# Patient Record
Sex: MALE | Race: OTHER | HISPANIC OR LATINO | URBAN - METROPOLITAN AREA
[De-identification: names, ages, dates, MRNs, and addresses within clinical notes are randomized per-mention and may not be internally consistent; named-entity substitution may affect disease eponyms.]

---

## 2018-02-20 ENCOUNTER — INPATIENT (INPATIENT)
Facility: HOSPITAL | Age: 23
LOS: 0 days | Discharge: HOME | DRG: 310 | End: 2018-02-21
Attending: SURGERY | Admitting: SURGERY
Payer: COMMERCIAL

## 2018-02-20 VITALS
OXYGEN SATURATION: 100 % | SYSTOLIC BLOOD PRESSURE: 158 MMHG | HEART RATE: 126 BPM | RESPIRATION RATE: 20 BRPM | TEMPERATURE: 99 F | DIASTOLIC BLOOD PRESSURE: 94 MMHG

## 2018-02-20 DIAGNOSIS — S06.0X0A CONCUSSION WITHOUT LOSS OF CONSCIOUSNESS, INITIAL ENCOUNTER: ICD-10-CM

## 2018-02-20 LAB
ALBUMIN SERPL ELPH-MCNC: 4.8 G/DL — SIGNIFICANT CHANGE UP (ref 3.3–5)
ALP SERPL-CCNC: 71 U/L — SIGNIFICANT CHANGE UP (ref 40–120)
ALT FLD-CCNC: 22 U/L RC — SIGNIFICANT CHANGE UP (ref 10–45)
ANION GAP SERPL CALC-SCNC: 14 MMOL/L — SIGNIFICANT CHANGE UP (ref 5–17)
APPEARANCE UR: CLEAR — SIGNIFICANT CHANGE UP
APTT BLD: 28.4 SEC — SIGNIFICANT CHANGE UP (ref 27.5–37.4)
AST SERPL-CCNC: 21 U/L — SIGNIFICANT CHANGE UP (ref 10–40)
BASOPHILS # BLD AUTO: 0.1 K/UL — SIGNIFICANT CHANGE UP (ref 0–0.2)
BASOPHILS NFR BLD AUTO: 0.3 % — SIGNIFICANT CHANGE UP (ref 0–2)
BILIRUB SERPL-MCNC: 0.3 MG/DL — SIGNIFICANT CHANGE UP (ref 0.2–1.2)
BILIRUB UR-MCNC: NEGATIVE — SIGNIFICANT CHANGE UP
BLD GP AB SCN SERPL QL: NEGATIVE — SIGNIFICANT CHANGE UP
BUN SERPL-MCNC: 10 MG/DL — SIGNIFICANT CHANGE UP (ref 7–23)
CALCIUM SERPL-MCNC: 9.5 MG/DL — SIGNIFICANT CHANGE UP (ref 8.4–10.5)
CHLORIDE SERPL-SCNC: 97 MMOL/L — SIGNIFICANT CHANGE UP (ref 96–108)
CK MB BLD-MCNC: 3.5 % — SIGNIFICANT CHANGE UP (ref 0–3.5)
CK MB CFR SERPL CALC: 8.9 NG/ML — HIGH (ref 0–6.7)
CK SERPL-CCNC: 256 U/L — HIGH (ref 30–200)
CO2 SERPL-SCNC: 27 MMOL/L — SIGNIFICANT CHANGE UP (ref 22–31)
COLOR SPEC: COLORLESS — SIGNIFICANT CHANGE UP
CREAT SERPL-MCNC: 0.72 MG/DL — SIGNIFICANT CHANGE UP (ref 0.5–1.3)
DIFF PNL FLD: NEGATIVE — SIGNIFICANT CHANGE UP
EOSINOPHIL # BLD AUTO: 0.2 K/UL — SIGNIFICANT CHANGE UP (ref 0–0.5)
EOSINOPHIL NFR BLD AUTO: 0.7 % — SIGNIFICANT CHANGE UP (ref 0–6)
GLUCOSE SERPL-MCNC: 116 MG/DL — HIGH (ref 70–99)
GLUCOSE UR QL: NEGATIVE — SIGNIFICANT CHANGE UP
HCT VFR BLD CALC: 45.7 % — SIGNIFICANT CHANGE UP (ref 39–50)
HGB BLD-MCNC: 16 G/DL — SIGNIFICANT CHANGE UP (ref 13–17)
INR BLD: 1.11 RATIO — SIGNIFICANT CHANGE UP (ref 0.88–1.16)
KETONES UR-MCNC: NEGATIVE — SIGNIFICANT CHANGE UP
LEUKOCYTE ESTERASE UR-ACNC: NEGATIVE — SIGNIFICANT CHANGE UP
LIDOCAIN IGE QN: 24 U/L — SIGNIFICANT CHANGE UP (ref 7–60)
LYMPHOCYTES # BLD AUTO: 1.8 K/UL — SIGNIFICANT CHANGE UP (ref 1–3.3)
LYMPHOCYTES # BLD AUTO: 8.6 % — LOW (ref 13–44)
MCHC RBC-ENTMCNC: 32 PG — SIGNIFICANT CHANGE UP (ref 27–34)
MCHC RBC-ENTMCNC: 35.1 GM/DL — SIGNIFICANT CHANGE UP (ref 32–36)
MCV RBC AUTO: 91.3 FL — SIGNIFICANT CHANGE UP (ref 80–100)
MONOCYTES # BLD AUTO: 0.8 K/UL — SIGNIFICANT CHANGE UP (ref 0–0.9)
MONOCYTES NFR BLD AUTO: 3.7 % — SIGNIFICANT CHANGE UP (ref 2–14)
NEUTROPHILS # BLD AUTO: 18.1 K/UL — HIGH (ref 1.8–7.4)
NEUTROPHILS NFR BLD AUTO: 86.7 % — HIGH (ref 43–77)
NITRITE UR-MCNC: NEGATIVE — SIGNIFICANT CHANGE UP
PH UR: 7 — SIGNIFICANT CHANGE UP (ref 5–8)
PLATELET # BLD AUTO: 401 K/UL — HIGH (ref 150–400)
POTASSIUM SERPL-MCNC: 3.1 MMOL/L — LOW (ref 3.5–5.3)
POTASSIUM SERPL-SCNC: 3.1 MMOL/L — LOW (ref 3.5–5.3)
PROT SERPL-MCNC: 8.6 G/DL — HIGH (ref 6–8.3)
PROT UR-MCNC: NEGATIVE — SIGNIFICANT CHANGE UP
PROTHROM AB SERPL-ACNC: 12.1 SEC — SIGNIFICANT CHANGE UP (ref 9.8–12.7)
RBC # BLD: 5.01 M/UL — SIGNIFICANT CHANGE UP (ref 4.2–5.8)
RBC # FLD: 11.3 % — SIGNIFICANT CHANGE UP (ref 10.3–14.5)
RH IG SCN BLD-IMP: POSITIVE — SIGNIFICANT CHANGE UP
SODIUM SERPL-SCNC: 138 MMOL/L — SIGNIFICANT CHANGE UP (ref 135–145)
SP GR SPEC: 1.02 — SIGNIFICANT CHANGE UP (ref 1.01–1.02)
TROPONIN T SERPL-MCNC: <0.01 NG/ML — SIGNIFICANT CHANGE UP (ref 0–0.06)
UROBILINOGEN FLD QL: NEGATIVE — SIGNIFICANT CHANGE UP
WBC # BLD: 20.9 K/UL — HIGH (ref 3.8–10.5)
WBC # FLD AUTO: 20.9 K/UL — HIGH (ref 3.8–10.5)

## 2018-02-20 PROCEDURE — 93010 ELECTROCARDIOGRAM REPORT: CPT | Mod: 59

## 2018-02-20 PROCEDURE — 73090 X-RAY EXAM OF FOREARM: CPT | Mod: 26,LT

## 2018-02-20 PROCEDURE — 70450 CT HEAD/BRAIN W/O DYE: CPT | Mod: 26

## 2018-02-20 PROCEDURE — 71260 CT THORAX DX C+: CPT | Mod: 26

## 2018-02-20 PROCEDURE — 99285 EMERGENCY DEPT VISIT HI MDM: CPT | Mod: 25

## 2018-02-20 PROCEDURE — 12002 RPR S/N/AX/GEN/TRNK2.6-7.5CM: CPT | Mod: RT

## 2018-02-20 PROCEDURE — 74177 CT ABD & PELVIS W/CONTRAST: CPT | Mod: 26

## 2018-02-20 RX ORDER — ONDANSETRON 8 MG/1
4 TABLET, FILM COATED ORAL ONCE
Qty: 0 | Refills: 0 | Status: COMPLETED | OUTPATIENT
Start: 2018-02-20 | End: 2018-02-20

## 2018-02-20 RX ORDER — SODIUM CHLORIDE 9 MG/ML
1000 INJECTION INTRAMUSCULAR; INTRAVENOUS; SUBCUTANEOUS ONCE
Qty: 0 | Refills: 0 | Status: COMPLETED | OUTPATIENT
Start: 2018-02-20 | End: 2018-02-20

## 2018-02-20 RX ORDER — ENOXAPARIN SODIUM 100 MG/ML
40 INJECTION SUBCUTANEOUS DAILY
Qty: 0 | Refills: 0 | Status: DISCONTINUED | OUTPATIENT
Start: 2018-02-20 | End: 2018-02-21

## 2018-02-20 RX ORDER — TETANUS TOXOID, REDUCED DIPHTHERIA TOXOID AND ACELLULAR PERTUSSIS VACCINE, ADSORBED 5; 2.5; 8; 8; 2.5 [IU]/.5ML; [IU]/.5ML; UG/.5ML; UG/.5ML; UG/.5ML
0.5 SUSPENSION INTRAMUSCULAR ONCE
Qty: 0 | Refills: 0 | Status: COMPLETED | OUTPATIENT
Start: 2018-02-20 | End: 2018-02-20

## 2018-02-20 RX ORDER — MORPHINE SULFATE 50 MG/1
4 CAPSULE, EXTENDED RELEASE ORAL ONCE
Qty: 0 | Refills: 0 | Status: DISCONTINUED | OUTPATIENT
Start: 2018-02-20 | End: 2018-02-20

## 2018-02-20 RX ADMIN — MORPHINE SULFATE 4 MILLIGRAM(S): 50 CAPSULE, EXTENDED RELEASE ORAL at 15:35

## 2018-02-20 RX ADMIN — ONDANSETRON 4 MILLIGRAM(S): 8 TABLET, FILM COATED ORAL at 13:29

## 2018-02-20 RX ADMIN — MORPHINE SULFATE 4 MILLIGRAM(S): 50 CAPSULE, EXTENDED RELEASE ORAL at 16:05

## 2018-02-20 RX ADMIN — SODIUM CHLORIDE 4000 MILLILITER(S): 9 INJECTION INTRAMUSCULAR; INTRAVENOUS; SUBCUTANEOUS at 16:50

## 2018-02-20 RX ADMIN — SODIUM CHLORIDE 4000 MILLILITER(S): 9 INJECTION INTRAMUSCULAR; INTRAVENOUS; SUBCUTANEOUS at 13:29

## 2018-02-20 RX ADMIN — TETANUS TOXOID, REDUCED DIPHTHERIA TOXOID AND ACELLULAR PERTUSSIS VACCINE, ADSORBED 0.5 MILLILITER(S): 5; 2.5; 8; 8; 2.5 SUSPENSION INTRAMUSCULAR at 13:29

## 2018-02-20 NOTE — ED PROVIDER NOTE - MEDICAL DECISION MAKING DETAILS
Level II trauma called from waiting room, I received patient directly to CC-D, patient without C-collar, c-collar placed as patient poor historian and distracted by chest wall pain. no sign of significant head trauma, likely concussion, will need to repair laceration to forearm. with chest wall pain and unclear mechanism of landing will pan scan. fluids. and antiemetics.

## 2018-02-20 NOTE — H&P ADULT - ATTENDING COMMENTS
Patient seen and examined  Not in distress, pain well controlled.  BP stable  Consistently tachycardic to 120's with intermittent episodes of 140's pulse.  No obvious traumatic injury on imaging.    - r/o blunt cardiac injury  - will send troponin  - will admit for telemetry monitoring to r/o any other arrythmia overnight   - no need for echocardiogram or ICU monitoring at this time as BP stable

## 2018-02-20 NOTE — ED PROVIDER NOTE - OBJECTIVE STATEMENT
23yo M , mechanical fall from ~8ft, unclear how landed, did hit head. per bystander landed on feet and fell forward? complaint of HA, nausea and diffuse chest pain. no LOC. no neck pain. no pain in legs. able to walk after. no back pain. no weakness/numbness. unknown tetanus status Code trauma level II activated, 21yo M , mechanical fall from ~8ft, unclear how landed, did hit head. per bystander landed on feet and fell forward? complaint of HA, nausea and diffuse chest pain. no LOC. no neck pain. no pain in legs. able to walk after. no back pain. no weakness/numbness. unknown tetanus status

## 2018-02-20 NOTE — ED ADULT NURSE NOTE - OBJECTIVE STATEMENT
21YO male with no pertinent PMH via walk in presenting with headache, nausea, vomiting, right forearm laceration, and bilateral ankle pain sp 8 foot fall at work.   Pt Axox4, gross neuro intact, PERRL 4 mm. Lungs clear throughout bilateral. S1S2 heard. Abdomen soft, non-tender, non-distended. Skin warm, dry, and intact. Safety and comfort measures maintained.     SEE TRAUMA FLOWSHEET 21YO male with no pertinent PMH via walk in presenting with headache, nausea, vomiting, right forearm laceration, and bilateral ankle pain sp 8 foot fall at work. Upon initial assessment, cervical spine collar applied by ED MD.  Pt Axox4, gross neuro intact, PERRL 4 mm. Lungs clear throughout bilateral. S1S2 heard. Abdomen soft, non-tender, non-distended. Skin warm, dry, and intact. Safety and comfort measures maintained.     SEE TRAUMA FLOWSHEET

## 2018-02-20 NOTE — ED PROVIDER NOTE - PHYSICAL EXAMINATION
Gen: vomiting, AOx3  Head: NCAT  HEENT: PERRL, oral mucosa moist, normal conjunctiva, neck supple  Lung: CTAB, no respiratory distress  CV: tachy regular, no murmur, Normal perfusion  Abd: soft, NTND  MSK: No edema, no visible deformities, no ttp C/T/L, diffuse anterior chest wall pain without crepitus/step offs   Neuro: No focal neurologic deficits  Skin: No rash, +4cm laceration to ulnar aspect distal rt forearm- no arterial bleeding  Psych: normal affect

## 2018-02-20 NOTE — H&P ADULT - NSHPPHYSICALEXAM_GEN_ALL_CORE
Gen: NAD  HEENT: normocephalic, atraumatic, no scleral icterus  CV: S1, S2, RRR  Pulm: CTA B/L  Abd: Soft, ND, NTP, right flank abrasion  Ext: warm, no edema, palp dp/pt. Right forearm laceration

## 2018-02-20 NOTE — ED PROVIDER NOTE - PROGRESS NOTE DETAILS
C-collar cleared by Dr. Alvarez Julio at bedside prior to CT imaging -Yessi WHITE spoke with radiology, radioopaque foreign bodies in maxillary region, patient without any laceration/abrasions to face, likely old. no other trauma findings on CT. waiting for reads and Sx clearance -Slowey DO walking around ED, will revital, HR had been improving, cleared by trauma -Yessi WHITE patient without complaints of pain or anxiety, still tachy up to 140/130s, denies drug/EtOH use. no recent illness. will speak with halina Berger DO

## 2018-02-20 NOTE — ED PROVIDER NOTE - NS ED ROS FT
ROS: +CP no SOB. no cough. no fever. no n/v/d/c. no abd pain. no rash. +bleeding. no urinary complaints. no weakness. no vision changes. +HA. no neck/back pain. no extremity swelling/deformity. No change in mental status.

## 2018-02-20 NOTE — CONSULT NOTE ADULT - SUBJECTIVE AND OBJECTIVE BOX
CC: Patient is a 22y old  Male who presents s/p fall.      HPI:  21 yo male s/p fall. He is a  and fell about 8 feet. He doesn't remember but as per his co-worker that is with him he fell on his feet and fell backward. He denies LOC. He has had no nausea or vomiting. He has had saying that he can't remember much about the incident.     Primary survey intact  Secondary: abrasion to right flank and right forearm laceration approximately 5 cm.     PMH  denies    PSH  denies    MEDS  denies    Allergies    No Known Allergies      Social  denies smoking      Physical Exam  T(C): 37 (02-20-18 @ 13:19), Max: 37 (02-20-18 @ 13:19)  HR: 128 (02-20-18 @ 13:30) (123 - 128)  BP: 151/83 (02-20-18 @ 13:30) (151/83 - 158/94)  RR: 16 (02-20-18 @ 13:30) (16 - 20)  SpO2: 100% (02-20-18 @ 13:30) (100% - 100%)  Tmax: T(C): , Max: 37 (02-20-18 @ 13:19)      Gen: NAD  HEENT: normocephalic, atraumatic, no scleral icterus  CV: S1, S2, RRR  Pulm: CTA B/L  Abd: Soft, ND, NTP, right flank abrasion  Ext: warm, no edema, palp dp/pt. Right forearm laceration      Labs:                        16.0   20.9  )-----------( 401      ( 20 Feb 2018 13:32 )             45.7     02-20    138  |  97  |  10  ----------------------------<  116<H>  3.1<L>   |  27  |  0.72    Ca    9.5      20 Feb 2018 13:32    TPro  8.6<H>  /  Alb  4.8  /  TBili  0.3  /  DBili  x   /  AST  21  /  ALT  22  /  AlkPhos  71  02-20    PT/INR - ( 20 Feb 2018 13:32 )   PT: 12.1 sec;   INR: 1.11 ratio         PTT - ( 20 Feb 2018 13:32 )  PTT:28.4 sec      Imaging

## 2018-02-20 NOTE — H&P ADULT - NSHPLABSRESULTS_GEN_ALL_CORE
CBC Full  -  ( 20 Feb 2018 13:32 )  WBC Count : 20.9 K/uL  Hemoglobin : 16.0 g/dL  Hematocrit : 45.7 %  Platelet Count - Automated : 401 K/uL  Mean Cell Volume : 91.3 fl  Mean Cell Hemoglobin : 32.0 pg  Mean Cell Hemoglobin Concentration : 35.1 gm/dL  Auto Neutrophil # : 18.1 K/uL  Auto Lymphocyte # : 1.8 K/uL  Auto Monocyte # : 0.8 K/uL  Auto Eosinophil # : 0.2 K/uL  Auto Basophil # : 0.1 K/uL  Auto Neutrophil % : 86.7 %  Auto Lymphocyte % : 8.6 %  Auto Monocyte % : 3.7 %  Auto Eosinophil % : 0.7 %  Auto Basophil % : 0.3 %      02-20    138  |  97  |  10  ----------------------------<  116<H>  3.1<L>   |  27  |  0.72    Ca    9.5      20 Feb 2018 13:32    TPro  8.6<H>  /  Alb  4.8  /  TBili  0.3  /  DBili  x   /  AST  21  /  ALT  22  /  AlkPhos  71  02-20    CT Abdomen and Pelvis w/ IV Cont (02.20.18 @ 16:11)     No acute traumatic injury in the chest, abdomen, or pelvis.

## 2018-02-20 NOTE — ED ADULT NURSE REASSESSMENT NOTE - NS ED NURSE REASSESS COMMENT FT1
Received pt alerted and oriented x3, states pain is relieved, HR still elevated, , other vitals WNL. Friends at the bedside. No signs of a acute distress noted at this moment. Will continue to monitor closely.
pt transported to CT imaging. Both siderails up. Vital signs stable. Pt denies any needs at this time. Safety and comfort maintained. Will reassess patient and vitals upon return.
pt reported 8/10 pain to bilateral ankles sp fall. Pt provided 4 mg IV morphine. Pt now appears more comfortable and states pain has decreased to 2/10. Will continue to monitor patient. Patient safety and comfort maintained.

## 2018-02-20 NOTE — CONSULT NOTE ADULT - ASSESSMENT
21 yo male s/p fall with right flank abrasion and right forearm laceration  -No spine tenderness will obtain CT of chest and abdomen

## 2018-02-20 NOTE — ED PROVIDER NOTE - PLAN OF CARE
1. Return to ED for worsening, progressive or any other concerning symptoms   2. Follow up with your primary care doctor in 2-3days   3. Take motrin 600mg every 6 hours as needed for pain and Take Tylenol up to 650 mg every 6 hours as needed for pain.   4. Keep the area clean and dry for 24hours, then treat the area as regular skin. Apply a small amount of bacitracin to the area 1-2times per day. Return to the ED or your primary care doctor for suture removal in 10 days.   5. Do not return to work until your memory is stable and you have no symptoms

## 2018-02-20 NOTE — ED PROVIDER NOTE - CARE PLAN
Principal Discharge DX:	Concussion without loss of consciousness, initial encounter  Assessment and plan of treatment:	1. Return to ED for worsening, progressive or any other concerning symptoms   2. Follow up with your primary care doctor in 2-3days   3. Take motrin 600mg every 6 hours as needed for pain and Take Tylenol up to 650 mg every 6 hours as needed for pain.   4. Keep the area clean and dry for 24hours, then treat the area as regular skin. Apply a small amount of bacitracin to the area 1-2times per day. Return to the ED or your primary care doctor for suture removal in 10 days.   5. Do not return to work until your memory is stable and you have no symptoms  Secondary Diagnosis:	Laceration of right upper extremity, initial encounter Principal Discharge DX:	Concussion without loss of consciousness, initial encounter  Assessment and plan of treatment:	1. Return to ED for worsening, progressive or any other concerning symptoms   2. Follow up with your primary care doctor in 2-3days   3. Take motrin 600mg every 6 hours as needed for pain and Take Tylenol up to 650 mg every 6 hours as needed for pain.   4. Keep the area clean and dry for 24hours, then treat the area as regular skin. Apply a small amount of bacitracin to the area 1-2times per day. Return to the ED or your primary care doctor for suture removal in 10 days.   5. Do not return to work until your memory is stable and you have no symptoms  Secondary Diagnosis:	Laceration of right upper extremity, initial encounter  Secondary Diagnosis:	Tachycardia

## 2018-02-20 NOTE — CONSULT NOTE ADULT - ATTENDING COMMENTS
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 no LOC. seen and examined upon arrival as level 2 trauma.    isolated right postero-medial forearm laceration  -tetanus  -wound irrigated and repaired by ER  -no foreign body on right forearm X-ray    CT c-spine / chest / abd / pelvis - no spine fractures    no evidence of lower extremity fractures on exam  -ok to D/C home if he can ambulate without pain

## 2018-02-20 NOTE — H&P ADULT - ASSESSMENT
23 yo male s/p fall with right forearm laceration  -No intra cranial hemorrhage or acute trauma to the chest abdomen or pelvis however patient persistently tachycardic.  -Will admit for observation

## 2018-02-20 NOTE — H&P ADULT - HISTORY OF PRESENT ILLNESS
21 yo male s/p fall. He is a  and fell about 8 feet. He doesn't remember but as per his co-worker that is with him he fell on his feet and fell backward. He denies LOC. He has had no nausea or vomiting. He has had saying that he can't remember much about the incident.     Primary survey intact  Secondary: abrasion to right flank and right forearm laceration approximately 5 cm.

## 2018-02-21 VITALS
SYSTOLIC BLOOD PRESSURE: 113 MMHG | TEMPERATURE: 98 F | RESPIRATION RATE: 18 BRPM | HEART RATE: 87 BPM | OXYGEN SATURATION: 99 % | DIASTOLIC BLOOD PRESSURE: 69 MMHG

## 2018-02-21 LAB
ANION GAP SERPL CALC-SCNC: 13 MMOL/L — SIGNIFICANT CHANGE UP (ref 5–17)
BUN SERPL-MCNC: 8 MG/DL — SIGNIFICANT CHANGE UP (ref 7–23)
CALCIUM SERPL-MCNC: 9.2 MG/DL — SIGNIFICANT CHANGE UP (ref 8.4–10.5)
CHLORIDE SERPL-SCNC: 102 MMOL/L — SIGNIFICANT CHANGE UP (ref 96–108)
CO2 SERPL-SCNC: 25 MMOL/L — SIGNIFICANT CHANGE UP (ref 22–31)
CREAT SERPL-MCNC: 0.73 MG/DL — SIGNIFICANT CHANGE UP (ref 0.5–1.3)
GLUCOSE SERPL-MCNC: 88 MG/DL — SIGNIFICANT CHANGE UP (ref 70–99)
HCT VFR BLD CALC: 41.6 % — SIGNIFICANT CHANGE UP (ref 39–50)
HGB BLD-MCNC: 14.5 G/DL — SIGNIFICANT CHANGE UP (ref 13–17)
MAGNESIUM SERPL-MCNC: 2 MG/DL — SIGNIFICANT CHANGE UP (ref 1.6–2.6)
MCHC RBC-ENTMCNC: 32.1 PG — SIGNIFICANT CHANGE UP (ref 27–34)
MCHC RBC-ENTMCNC: 34.9 GM/DL — SIGNIFICANT CHANGE UP (ref 32–36)
MCV RBC AUTO: 92.1 FL — SIGNIFICANT CHANGE UP (ref 80–100)
PHOSPHATE SERPL-MCNC: 4.4 MG/DL — SIGNIFICANT CHANGE UP (ref 2.5–4.5)
PLATELET # BLD AUTO: 334 K/UL — SIGNIFICANT CHANGE UP (ref 150–400)
POTASSIUM SERPL-MCNC: 4.2 MMOL/L — SIGNIFICANT CHANGE UP (ref 3.5–5.3)
POTASSIUM SERPL-SCNC: 4.2 MMOL/L — SIGNIFICANT CHANGE UP (ref 3.5–5.3)
RBC # BLD: 4.51 M/UL — SIGNIFICANT CHANGE UP (ref 4.2–5.8)
RBC # FLD: 11.4 % — SIGNIFICANT CHANGE UP (ref 10.3–14.5)
SODIUM SERPL-SCNC: 140 MMOL/L — SIGNIFICANT CHANGE UP (ref 135–145)
TROPONIN T SERPL-MCNC: <0.01 NG/ML — SIGNIFICANT CHANGE UP (ref 0–0.06)
WBC # BLD: 9.8 K/UL — SIGNIFICANT CHANGE UP (ref 3.8–10.5)
WBC # FLD AUTO: 9.8 K/UL — SIGNIFICANT CHANGE UP (ref 3.8–10.5)

## 2018-02-21 PROCEDURE — 84484 ASSAY OF TROPONIN QUANT: CPT

## 2018-02-21 PROCEDURE — 96375 TX/PRO/DX INJ NEW DRUG ADDON: CPT | Mod: XU

## 2018-02-21 PROCEDURE — 99231 SBSQ HOSP IP/OBS SF/LOW 25: CPT

## 2018-02-21 PROCEDURE — 80048 BASIC METABOLIC PNL TOTAL CA: CPT

## 2018-02-21 PROCEDURE — 12002 RPR S/N/AX/GEN/TRNK2.6-7.5CM: CPT

## 2018-02-21 PROCEDURE — 85730 THROMBOPLASTIN TIME PARTIAL: CPT

## 2018-02-21 PROCEDURE — 86900 BLOOD TYPING SEROLOGIC ABO: CPT

## 2018-02-21 PROCEDURE — 85027 COMPLETE CBC AUTOMATED: CPT

## 2018-02-21 PROCEDURE — 96374 THER/PROPH/DIAG INJ IV PUSH: CPT | Mod: XU

## 2018-02-21 PROCEDURE — 85610 PROTHROMBIN TIME: CPT

## 2018-02-21 PROCEDURE — 82550 ASSAY OF CK (CPK): CPT

## 2018-02-21 PROCEDURE — 86901 BLOOD TYPING SEROLOGIC RH(D): CPT

## 2018-02-21 PROCEDURE — 70450 CT HEAD/BRAIN W/O DYE: CPT

## 2018-02-21 PROCEDURE — 74177 CT ABD & PELVIS W/CONTRAST: CPT

## 2018-02-21 PROCEDURE — 83735 ASSAY OF MAGNESIUM: CPT

## 2018-02-21 PROCEDURE — 81003 URINALYSIS AUTO W/O SCOPE: CPT

## 2018-02-21 PROCEDURE — 86850 RBC ANTIBODY SCREEN: CPT

## 2018-02-21 PROCEDURE — 71260 CT THORAX DX C+: CPT

## 2018-02-21 PROCEDURE — 73090 X-RAY EXAM OF FOREARM: CPT

## 2018-02-21 PROCEDURE — 83690 ASSAY OF LIPASE: CPT

## 2018-02-21 PROCEDURE — 84100 ASSAY OF PHOSPHORUS: CPT

## 2018-02-21 PROCEDURE — 93005 ELECTROCARDIOGRAM TRACING: CPT | Mod: XU

## 2018-02-21 PROCEDURE — 90471 IMMUNIZATION ADMIN: CPT

## 2018-02-21 PROCEDURE — 90715 TDAP VACCINE 7 YRS/> IM: CPT

## 2018-02-21 PROCEDURE — 82553 CREATINE MB FRACTION: CPT

## 2018-02-21 PROCEDURE — 99285 EMERGENCY DEPT VISIT HI MDM: CPT | Mod: 25

## 2018-02-21 PROCEDURE — 80053 COMPREHEN METABOLIC PANEL: CPT

## 2018-02-21 RX ORDER — POTASSIUM PHOSPHATE, MONOBASIC POTASSIUM PHOSPHATE, DIBASIC 236; 224 MG/ML; MG/ML
30 INJECTION, SOLUTION INTRAVENOUS ONCE
Qty: 0 | Refills: 0 | Status: COMPLETED | OUTPATIENT
Start: 2018-02-21 | End: 2018-02-21

## 2018-02-21 RX ORDER — OXYCODONE HYDROCHLORIDE 5 MG/1
5 TABLET ORAL ONCE
Qty: 0 | Refills: 0 | Status: DISCONTINUED | OUTPATIENT
Start: 2018-02-21 | End: 2018-02-21

## 2018-02-21 RX ADMIN — OXYCODONE HYDROCHLORIDE 5 MILLIGRAM(S): 5 TABLET ORAL at 01:14

## 2018-02-21 RX ADMIN — OXYCODONE HYDROCHLORIDE 5 MILLIGRAM(S): 5 TABLET ORAL at 02:21

## 2018-02-21 RX ADMIN — POTASSIUM PHOSPHATE, MONOBASIC POTASSIUM PHOSPHATE, DIBASIC 83.33 MILLIMOLE(S): 236; 224 INJECTION, SOLUTION INTRAVENOUS at 02:21

## 2018-02-21 NOTE — DISCHARGE NOTE ADULT - CARE PROVIDER_API CALL
Rajiv Montgomery), Surgery; Surgical Critical Care  [address] [place] [address]  Phone: [phone]  Fax: [phone] Alvarez Julio), Surgery; Surgical Critical Care  [address] [id number]  Phone: [phone]  Fax: [phone]

## 2018-02-21 NOTE — PROGRESS NOTE ADULT - ASSESSMENT
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    Plan:  - Pain controlled  - Regular diet  - Ambulating  - Tachycardia resolved  - D/C home

## 2018-02-21 NOTE — DISCHARGE NOTE ADULT - PATIENT PORTAL LINK FT
You can access the [facility] Patient Portal, offered by [facility], by registering with the following website: [email or web address]

## 2018-02-21 NOTE — PROGRESS NOTE ADULT - SUBJECTIVE AND OBJECTIVE BOX
CC:Patient is a 22y old  Male who presents with a chief complaint of s/p fall (21 Feb 2018 06:59)      Subjective:  Pt seen and examined at bedside. Patient complains of some chest wall tenderness, denies abdominal pain. Denies palpitations/SOB. Tolerating regular diet.       Vital Signs Last 24 Hrs  T(C): 36.9 (21 Feb 2018 04:24), Max: 37.6 (20 Feb 2018 15:33)  T(F): 98.4 (21 Feb 2018 04:24), Max: 99.6 (20 Feb 2018 15:33)  HR: 87 (21 Feb 2018 04:24) (82 - 128)  BP: 113/69 (21 Feb 2018 04:24) (113/69 - 165/97)  BP(mean): --  RR: 18 (21 Feb 2018 04:24) (16 - 20)  SpO2: 99% (21 Feb 2018 04:24) (94% - 100%)    Labs:      CARDIAC MARKERS ( 20 Feb 2018 18:50 )  x     / <0.01 ng/mL / 256 U/L / x     / 8.9 ng/mL                            16.0   20.9  )-----------( 401      ( 20 Feb 2018 13:32 )             45.7     CBC Full  -  ( 20 Feb 2018 13:32 )  WBC Count : 20.9 K/uL  Hemoglobin : 16.0 g/dL  Hematocrit : 45.7 %  Platelet Count - Automated : 401 K/uL    02-20    138  |  97  |  10  ----------------------------<  116<H>  3.1<L>   |  27  |  0.72    Ca    9.5      20 Feb 2018 13:32    TPro  8.6<H>  /  Alb  4.8  /  TBili  0.3  /  DBili  x   /  AST  21  /  ALT  22  /  AlkPhos  71  02-20    LIVER FUNCTIONS - ( 20 Feb 2018 13:32 )  Alb: 4.8 g/dL / Pro: 8.6 g/dL / ALK PHOS: 71 U/L / ALT: 22 U/L RC / AST: 21 U/L / GGT: x           PT/INR - ( 20 Feb 2018 13:32 )   PT: 12.1 sec;   INR: 1.11 ratio         PTT - ( 20 Feb 2018 13:32 )  PTT:28.4 sec          Physical exam:  Gen: appears comfortable, A&Ox3  Resp: no inc WOB  Chest: mild chest wall tenderness  Abd: soft, NT/ND  Ext: R forearm laceration sutures intact, no erythema

## 2018-02-21 NOTE — PROVIDER CONTACT NOTE (OTHER) - ACTION/TREATMENT ORDERED:
As per ELVIN Salas, will order supplementation for potassium. Will continue to monitor and maintain safety.
As per ELVIN Salas. Will order medication for pain and continue to monitor and maintain safety.

## 2018-02-21 NOTE — PROVIDER CONTACT NOTE (OTHER) - ASSESSMENT
Patient A&Ox4. c/o chest pain, 5 out of 10 on pain scale, midsternal in location with no radiation, exacerbated by taking deep breaths. Patient states "hurts on the outside, not the inside". Denies headache, palpitations, SOB.
Patient is A&Ox4. Serum potassium 3.1. Denies substernal chest pain, palpitation, SOB, weakness.

## 2018-02-21 NOTE — DISCHARGE NOTE ADULT - CARE PROVIDERS DIRECT ADDRESSES
[email or web address]

## 2018-02-21 NOTE — DISCHARGE NOTE ADULT - CARE PLAN
Principal Discharge DX:	Tachycardia  Goal:	Resolved  Assessment and plan of treatment:	Encouraged to follow up with your PCP.  Secondary Diagnosis:	Laceration of right upper extremity, initial encounter  Goal:	Sutured  Assessment and plan of treatment:	If you have questions about your hospital stay you can call the office of Dr. Montgomery. You can follow up in our clinic for removal of sutures. Principal Discharge DX:	Tachycardia  Goal:	Maintain normal heart rate  Assessment and plan of treatment:	Encouraged to follow up with your PCP for evaluation and workup if necessary.  Secondary Diagnosis:	Laceration of right upper extremity, initial encounter  Goal:	Recovery  Assessment and plan of treatment:	If you have questions about your hospital stay you can call the office of Dr. Julio. You can follow up in our clinic for removal of sutures.

## 2018-02-21 NOTE — PROGRESS NOTE ADULT - ATTENDING COMMENTS
seen and examined 2/21/2018 @ 0940    sternal chest pain, reproducible, controlled with oxycodone last night  r/o blunt cardiac injury - tachycardia resolved  -f/u in the office in 2 weeks for right forearm suture removal

## 2018-02-21 NOTE — DISCHARGE NOTE ADULT - PLAN OF CARE
If you have questions about your hospital stay you can call the office of Dr. Montgomery. You can follow up in our clinic for removal of sutures. Resolved Encouraged to follow up with your PCP. Sutured Maintain normal heart rate Encouraged to follow up with your PCP for evaluation and workup if necessary. Recovery If you have questions about your hospital stay you can call the office of Dr. Julio. You can follow up in our clinic for removal of sutures.

## 2018-02-21 NOTE — PROVIDER CONTACT NOTE (OTHER) - BACKGROUND
Admit Dx: concussion without loss of consciousness. s/p fall off 10ft building. tachycardia.
Admit Dx: concussion without loss of consciousness. s/o fall off 10ft building. tachycardia.

## 2018-02-21 NOTE — DISCHARGE NOTE ADULT - HAS THE PATIENT RECEIVED THE INFLUENZA VACCINE DURING THIS VISIT
"    Preventive Care at the 15 - 18 Year Visit    Growth Percentiles & Measurements   Weight: 133 lbs 6.4 oz / 60.5 kg (actual weight) / 26 %ile based on CDC 2-20 Years weight-for-age data using vitals from 8/7/2017.   Length: 5' 8\" / 172.7 cm 32 %ile based on CDC 2-20 Years stature-for-age data using vitals from 8/7/2017.   BMI: Body mass index is 20.28 kg/(m^2). 29 %ile based on CDC 2-20 Years BMI-for-age data using vitals from 8/7/2017.   Blood Pressure: Blood pressure percentiles are 17.3 % systolic and 68.3 % diastolic based on NHBPEP's 4th Report.     Next Visit    Continue to see your health care provider every one to two years for preventive care.    Nutrition    It s very important to eat breakfast. This will help you make it through the morning.    Sit down with your family for a meal on a regular basis.    Eat healthy meals and snacks, including fruits and vegetables. Avoid salty and sugary snack foods.    Be sure to eat foods that are high in calcium and iron.    Avoid or limit caffeine (often found in soda pop).    Sleeping    Your body needs about 9 hours of sleep each night.    Keep screens (TV, computer, and video) out of the bedroom / sleeping area.  They can lead to poor sleep habits and increased obesity.    Health    Limit TV, computer and video time.    Set a goal to be physically fit.  Do some form of exercise every day.  It can be an active sport like skating, running, swimming, a team sport, etc.    Try to get 30 to 60 minutes of exercise at least three times a week.    Make healthy choices: don t smoke or drink alcohol; don t use drugs.    In your teen years, you can expect . . .    To develop or strengthen hobbies.    To build strong friendships.    To be more responsible for yourself and your actions.    To be more independent.    To set more goals for yourself.    To use words that best express your thoughts and feelings.    To develop self-confidence and a sense of self.    To make " choices about your education and future career.    To see big differences in how you and your friends grow and develop.    To have body odor from perspiration (sweating).  Use underarm deodorant each day.    To have some acne, sometimes or all the time.  (Talk with your doctor or nurse about this.)    Most girls have finished going through puberty by 15 to 16 years. Often, boys are still growing and building muscle mass.    Sexuality    It is normal to have sexual feelings.    Find a supportive person who can answer questions about puberty, sexual development, sex, abstinence (choosing not to have sex), sexually transmitted diseases (STDs) and birth control.    Think about how you can say no to sex.    Safety    Accidents are the greatest threat to your health and life.    Avoid dangerous behaviors and situations.  For example, never drive after drinking or using drugs.  Never get in a car if the  has been drinking or using drugs.    Always wear a seat belt in the car.  When you drive, make it a rule for all passengers to wear seat belts, too.    Stay within the speed limit and avoid distractions.    Practice a fire escape plan at home. Check smoke detector batteries twice a year.    Keep electric items (like blow dryers, razors, curling irons, etc.) away from water.    Wear a helmet and other protective gear when bike riding, skating, skateboarding, etc.    Use sunscreen to reduce your risk of skin cancer.    Learn first aid and CPR (cardiopulmonary resuscitation).    Avoid peers who try to pressure you into risky activities.    Learn skills to manage stress, anger and conflict.    Do not use or carry any kind of weapon.    Find a supportive person (teacher, parent, health provider, counselor) whom you can talk to when you feel sad, angry, lonely or like hurting yourself.    Find help if you are being abused physically or sexually, or if you fear being hurt by others.    As a teenager, you will be given more  responsibility for your health and health care decisions.  While your parent or guardian still has an important role, you will likely start spending some time alone with your health care provider as you get older.  Some teen health issues are actually considered confidential, and are protected by law.  Your health care team will discuss this and what it means with you.  Our goal is for you to become comfortable and confident caring for your own health.  ================================================================   No

## 2018-02-21 NOTE — DISCHARGE NOTE ADULT - HOSPITAL COURSE
H&P: 21 yo male s/p fall. He is a  and fell about 8 feet. He doesn't remember but as per his co-worker that is with him he fell on his feet and fell backward. He denies LOC. He has had no nausea or vomiting. He has had saying that he can't remember much about the incident.     R forearm laceration sutured in ED. Patient instructed to follow up with PCP.     Patient was brought to telemetry floor for close monitoring. Tachycardia resolved. Patient denies pain. H&P: 23 yo male s/p fall. He is a  and fell about 8 feet. He doesn't remember but as per his co-worker that is with him he fell on his feet and fell backward. He denies LOC. He has had no nausea or vomiting. He has had saying that he can't remember much about the incident.   R forearm laceration sutured in ED. Patient instructed to follow up with PCP.   Patient was brought to telemetry floor for close monitoring. Tachycardia resolved. Patient denies pain. He was monitored on telemetry without any acute events.  Serial EKG's showed sinus tachycardia.  Troponins negative for MI.  Potassium was repleted during hospitalization for hypokalemia. He is ambulating, voiding, is tolerating a diet, and pain is controlled.  He is stable for discharge home and will follow-up with Dr. Julio within 1-2 weeks.
